# Patient Record
Sex: FEMALE | Race: BLACK OR AFRICAN AMERICAN | NOT HISPANIC OR LATINO | URBAN - METROPOLITAN AREA
[De-identification: names, ages, dates, MRNs, and addresses within clinical notes are randomized per-mention and may not be internally consistent; named-entity substitution may affect disease eponyms.]

---

## 2023-01-10 ENCOUNTER — EMERGENCY (EMERGENCY)
Facility: HOSPITAL | Age: 53
LOS: 0 days | Discharge: HOME | End: 2023-01-11
Attending: STUDENT IN AN ORGANIZED HEALTH CARE EDUCATION/TRAINING PROGRAM
Payer: OTHER MISCELLANEOUS

## 2023-01-10 VITALS
SYSTOLIC BLOOD PRESSURE: 105 MMHG | DIASTOLIC BLOOD PRESSURE: 69 MMHG | TEMPERATURE: 98 F | WEIGHT: 156.09 LBS | HEIGHT: 64 IN | RESPIRATION RATE: 18 BRPM | OXYGEN SATURATION: 98 % | HEART RATE: 71 BPM

## 2023-01-10 DIAGNOSIS — Z79.82 LONG TERM (CURRENT) USE OF ASPIRIN: ICD-10-CM
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
## 2023-01-10 DIAGNOSIS — S00.83XA CONTUSION OF OTHER PART OF HEAD, INITIAL ENCOUNTER: ICD-10-CM
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
## 2023-01-10 DIAGNOSIS — S10.93XA CONTUSION OF UNSPECIFIED PART OF NECK, INITIAL ENCOUNTER: ICD-10-CM

## 2023-01-10 DIAGNOSIS — M54.2 CERVICALGIA: ICD-10-CM

## 2023-01-10 DIAGNOSIS — S40.021A CONTUSION OF RIGHT UPPER ARM, INITIAL ENCOUNTER: ICD-10-CM
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
## 2023-01-10 DIAGNOSIS — M79.601 PAIN IN RIGHT ARM: ICD-10-CM
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
## 2023-01-10 PROCEDURE — 99285 EMERGENCY DEPT VISIT HI MDM: CPT

## 2023-01-10 NOTE — ED ADULT TRIAGE NOTE - CHIEF COMPLAINT QUOTE
Patient had mechanical fall at work today. C/o headache, right shoulder, right hip and right wrist pain. Denies blood thinners.

## 2023-01-11 VITALS
SYSTOLIC BLOOD PRESSURE: 156 MMHG | RESPIRATION RATE: 17 BRPM | HEART RATE: 64 BPM | TEMPERATURE: 98 F | DIASTOLIC BLOOD PRESSURE: 80 MMHG | OXYGEN SATURATION: 100 %

## 2023-01-11 PROCEDURE — 70450 CT HEAD/BRAIN W/O DYE: CPT | Mod: 26,MA

## 2023-01-11 PROCEDURE — 73110 X-RAY EXAM OF WRIST: CPT | Mod: 26,RT

## 2023-01-11 PROCEDURE — 72125 CT NECK SPINE W/O DYE: CPT | Mod: 26,MA

## 2023-01-11 PROCEDURE — 73030 X-RAY EXAM OF SHOULDER: CPT | Mod: 26,RT

## 2023-01-11 PROCEDURE — 72220 X-RAY EXAM SACRUM TAILBONE: CPT | Mod: 26

## 2023-01-11 RX ORDER — ACETAMINOPHEN 500 MG
650 TABLET ORAL ONCE
Refills: 0 | Status: COMPLETED | OUTPATIENT
Start: 2023-01-11 | End: 2023-01-11

## 2023-01-11 RX ADMIN — Medication 650 MILLIGRAM(S): at 04:44

## 2023-01-11 NOTE — ED PROVIDER NOTE - OBJECTIVE STATEMENT
52F [age]Hx 52F no pmhx, no AC presenting with right neck pain and right arm pain and lower buttocks pain s/p slip and fall backwards today. Denies any head strike, landed on right wrist. Mild achy pain on positioning. Otherwise: denies any chest pain, abdominal pain, shortness of breath, nausea/vomiting, head trauma,   back pain, hip pain, other extremity injury, LOC, syncope, lightheadedness, anticoagulation use, headaches, visual complaints, rashes, fevers/chills, difficulty ambulating, abrasions, lacerations, subjective neurological symptoms.

## 2023-01-11 NOTE — ED ADULT NURSE NOTE - OBJECTIVE STATEMENT
Pt stated she had a fall x 1 day, feels discomfort towards right arm. stated she fell back hit her head;.   no PMHx.  no allergies

## 2023-01-11 NOTE — ED PROVIDER NOTE - PHYSICAL EXAMINATION
VITAL SIGNS: I have reviewed nursing notes and confirm.   GEN: Well-developed; well-nourished; in no acute distress. Speaking full sentences. GCS 15  SKIN: Warm, pink, no rash, no diaphoresis, no cyanosis, well perfused.   HEAD: Normocephalic; atraumatic. No scalp lacerations, no abrasions.  NECK: Supple; (+) mild right lateral tender.  NO midline ttp,  NO step offs,  Full ROM w/o pain.  EYES: Pupils 3mm equal, round, reactive to light and accomodation, conjunctiva and sclera clear. Extra-ocular movements intact bilaterally.  ENT: No nasal discharge; airway clear. Trachea is midline. ORAL: No oropharyngeal exudates or erythema. Normal dentition.  CV: Regular rate and rhythm. S1, S2 normal; no murmurs, gallops, or rubs. No lower extremity pitting edema bilaterally. Capillary refill < 2 seconds throughout. Distal pulses intact 2+ throughout.  NO chest wall TTP.  RESP: CTA bilaterally. No wheezes, rales, or rhonchi.   ABD: Normal bowel sounds, soft, non-distended, non-tender, no rebound, no guarding, no rigidity, no hepatosplenomegaly. No CVA tenderness bilaterally. (+) full active ROM of RIGHT arm. no ttp shoulder.   MSK: Normal range of motion and movement of all 4 extremities. No joint or muscular pain throughout. No clubbing.    BACK:  NO thoracolumbar midline TTP,  NO parathoracic TTP,  NO paralumbar TTP. No step-offs or obvious deformities.   NEURO: Alert & oriented x 3, Grossly unremarkable. Sensory and motor intact throughout. No focal deficits. Gait: Fluid. Normal speech and coordination.   PSYCH: Cooperative, appropriate.

## 2023-01-11 NOTE — ED PROVIDER NOTE - CLINICAL SUMMARY MEDICAL DECISION MAKING FREE TEXT BOX
Pt with no hx of anticoagulation p/w mechanical fall slip backwards w/ headache, neck pain, right arm pain, lower buttocks pain.. (-) LOC. Normal appearing without any signs or symptoms of serious injury on secondary trauma survey. Low suspicion for intracranial hemorrhage or other intracranial traumatic injury. No periorbital or posterior periauricular ecchymosis to suggest skull fracture. No abdominal ecchymosis to indicate concern for serious trauma to the thorax or abdomen. Pelvis without evidence of injury and patient is neurologically intact. Stable gait and tolerating PO. Otherwise no other evidence of deformity or joint instability.  - CT HEAD/NECK   - XR shoulder/sacrum, wrist negative.  - Pain control PRN, Anti-emetics PRN  - Re-eval for disposition.

## 2023-01-11 NOTE — ED PROVIDER NOTE - PATIENT PORTAL LINK FT
You can access the FollowMyHealth Patient Portal offered by [facility] by registering at the following website: [email or web address]. By joining [employer]’s FollowMyHealth portal, you will also be able to view your health information using other applications (apps) compatible with our system.

## 2025-07-03 PROBLEM — Z00.00 ENCOUNTER FOR PREVENTIVE HEALTH EXAMINATION: Status: ACTIVE | Noted: 2025-07-03

## 2025-07-09 ENCOUNTER — LABORATORY RESULT (OUTPATIENT)
Age: 55
End: 2025-07-09

## 2025-07-09 ENCOUNTER — NON-APPOINTMENT (OUTPATIENT)
Age: 55
End: 2025-07-09

## 2025-07-09 ENCOUNTER — APPOINTMENT (OUTPATIENT)
Dept: OBGYN | Facility: CLINIC | Age: 55
End: 2025-07-09
Payer: COMMERCIAL

## 2025-07-09 VITALS — SYSTOLIC BLOOD PRESSURE: 128 MMHG | WEIGHT: 153 LBS | HEART RATE: 77 BPM | DIASTOLIC BLOOD PRESSURE: 77 MMHG

## 2025-07-09 PROBLEM — Z12.39 BREAST CANCER SCREENING: Status: ACTIVE | Noted: 2025-07-09

## 2025-07-09 PROBLEM — N60.19 FIBROCYSTIC BREAST: Status: ACTIVE | Noted: 2025-07-09

## 2025-07-09 PROBLEM — Z11.51 SCREENING FOR HUMAN PAPILLOMAVIRUS (HPV): Status: ACTIVE | Noted: 2025-07-09

## 2025-07-09 PROBLEM — N39.0 ACUTE LOWER UTI (URINARY TRACT INFECTION): Status: ACTIVE | Noted: 2025-07-09 | Resolved: 2025-08-08

## 2025-07-09 PROBLEM — Z12.4 ENCOUNTER FOR PAPANICOLAOU SMEAR FOR CERVICAL CANCER SCREENING: Status: ACTIVE | Noted: 2025-07-09

## 2025-07-09 PROBLEM — N81.10 CYSTOCELE, UNSPECIFIED: Status: ACTIVE | Noted: 2025-07-09

## 2025-07-09 PROBLEM — N76.0 ACUTE VAGINITIS: Status: ACTIVE | Noted: 2025-07-09

## 2025-07-09 PROCEDURE — 99214 OFFICE O/P EST MOD 30 MIN: CPT | Mod: 25

## 2025-07-09 PROCEDURE — 99459 PELVIC EXAMINATION: CPT | Mod: NC

## 2025-07-09 PROCEDURE — 99396 PREV VISIT EST AGE 40-64: CPT

## 2025-07-09 RX ORDER — PHENAZOPYRIDINE HYDROCHLORIDE 100 MG/1
60 TABLET, COATED ORAL
Refills: 0 | Status: ACTIVE | COMMUNITY

## 2025-07-09 RX ORDER — METRONIDAZOLE 500 MG/1
500 TABLET ORAL
Qty: 10 | Refills: 0 | Status: ACTIVE | COMMUNITY
Start: 2025-07-09 | End: 1900-01-01

## 2025-07-09 RX ORDER — FLUCONAZOLE 150 MG/1
150 TABLET ORAL
Qty: 2 | Refills: 1 | Status: ACTIVE | COMMUNITY
Start: 2025-07-09 | End: 1900-01-01

## 2025-07-10 PROBLEM — Z01.411 ENCOUNTER FOR WELL WOMAN EXAM WITH ABNORMAL FINDINGS: Status: ACTIVE | Noted: 2025-07-10

## 2025-07-10 LAB
APPEARANCE: CLEAR
BILIRUBIN URINE: NEGATIVE
BLOOD URINE: NEGATIVE
COLOR: YELLOW
GLUCOSE QUALITATIVE U: NEGATIVE MG/DL
KETONES URINE: NEGATIVE MG/DL
LEUKOCYTE ESTERASE URINE: ABNORMAL
NITRITE URINE: NEGATIVE
PH URINE: 7.5
PROTEIN URINE: NEGATIVE MG/DL
SPECIFIC GRAVITY URINE: 1.01
UROBILINOGEN URINE: 0.2 MG/DL

## 2025-07-11 LAB
BACTERIA UR CULT: NORMAL
HPV HIGH+LOW RISK DNA PNL CVX: NOT DETECTED

## 2025-07-12 LAB
A VAGINAE DNA VAG QL NAA+PROBE: ABNORMAL
BVAB2 DNA VAG QL NAA+PROBE: NORMAL
C KRUSEI DNA VAG QL NAA+PROBE: NEGATIVE
C KRUSEI DNA VAG QL NAA+PROBE: NEGATIVE
C KRUSEI DNA VAG QL NAA+PROBE: NORMAL
C KRUSEI DNA VAG QL NAA+PROBE: NORMAL
C TRACH RRNA SPEC QL NAA+PROBE: NEGATIVE
CANDIDA DNA VAG QL NAA+PROBE: NORMAL
MEGA1 DNA VAG QL NAA+PROBE: NORMAL
N GONORRHOEA RRNA SPEC QL NAA+PROBE: NEGATIVE
T VAGINALIS RRNA SPEC QL NAA+PROBE: NEGATIVE

## 2025-07-13 LAB — CYTOLOGY CVX/VAG DOC THIN PREP: NORMAL

## 2025-09-03 DIAGNOSIS — R39.9 UNSPECIFIED SYMPTOMS AND SIGNS INVOLVING THE GENITOURINARY SYSTEM: ICD-10-CM

## 2025-09-03 RX ORDER — SULFAMETHOXAZOLE AND TRIMETHOPRIM 800; 160 MG/1; MG/1
800-160 TABLET ORAL TWICE DAILY
Qty: 6 | Refills: 0 | Status: ACTIVE | COMMUNITY
Start: 2025-09-03 | End: 1900-01-01